# Patient Record
Sex: FEMALE | URBAN - METROPOLITAN AREA
[De-identification: names, ages, dates, MRNs, and addresses within clinical notes are randomized per-mention and may not be internally consistent; named-entity substitution may affect disease eponyms.]

---

## 2022-12-06 ENCOUNTER — APPOINTMENT (RX ONLY)
Dept: URBAN - METROPOLITAN AREA CLINIC 352 | Facility: CLINIC | Age: 67
Setting detail: DERMATOLOGY
End: 2022-12-06

## 2022-12-06 DIAGNOSIS — B02.9 ZOSTER WITHOUT COMPLICATIONS: ICD-10-CM

## 2022-12-06 PROCEDURE — 99204 OFFICE O/P NEW MOD 45 MIN: CPT

## 2022-12-06 PROCEDURE — ? DIAGNOSIS COMMENT

## 2022-12-06 PROCEDURE — ? PRESCRIPTION MEDICATION MANAGEMENT

## 2022-12-06 PROCEDURE — ? PRESCRIPTION

## 2022-12-06 PROCEDURE — ? COUNSELING

## 2022-12-06 RX ORDER — VALACYCLOVIR 1 G/1
1 TABLET, FILM COATED ORAL TID
Qty: 21 | Refills: 0 | Status: ERX | COMMUNITY
Start: 2022-12-06

## 2022-12-06 RX ADMIN — VALACYCLOVIR 1: 1 TABLET, FILM COATED ORAL at 00:00

## 2022-12-06 ASSESSMENT — LOCATION SIMPLE DESCRIPTION DERM
LOCATION SIMPLE: L3 RIGHT POSTERIOR DERMATOME
LOCATION SIMPLE: L3 RIGHT ANTERIOR DERMATOME

## 2022-12-06 ASSESSMENT — LOCATION ZONE DERM
LOCATION ZONE: LEG
LOCATION ZONE: TRUNK

## 2022-12-06 ASSESSMENT — LOCATION DETAILED DESCRIPTION DERM
LOCATION DETAILED: L3 RIGHT ANTERIOR DERMATOME
LOCATION DETAILED: L3 RIGHT POSTERIOR DERMATOME

## 2022-12-06 NOTE — PROCEDURE: PRESCRIPTION MEDICATION MANAGEMENT
Initiate Treatment: valacyclovir 1 gram tablet Tid\\nQuantity: 21.0 Tablet  Days Supply: 7\\nSig: Take 1 tablet 3 times daily x 7 days
Render In Strict Bullet Format?: No
Continue Regimen: Otc hydrocortisone
Detail Level: Zone

## 2022-12-06 NOTE — PROCEDURE: DIAGNOSIS COMMENT
Detail Level: Simple
Render Risk Assessment In Note?: no
Comment: Discussed the diagnosis of VZV/Herpes Zoster with the patient.

## 2022-12-15 ENCOUNTER — RX ONLY (OUTPATIENT)
Age: 67
Setting detail: RX ONLY
End: 2022-12-15

## 2022-12-15 RX ORDER — GABAPENTIN 300 MG/1
1 CAPSULE ORAL AS DIRECTED
Qty: 90 | Refills: 0 | Status: ERX | COMMUNITY
Start: 2022-12-15

## 2022-12-15 RX ORDER — LIDOCAINE 50 MG/G
3 PATCH CUTANEOUS
Qty: 60 | Refills: 0 | Status: ERX | COMMUNITY
Start: 2022-12-15

## 2022-12-20 ENCOUNTER — APPOINTMENT (RX ONLY)
Dept: URBAN - METROPOLITAN AREA CLINIC 352 | Facility: CLINIC | Age: 67
Setting detail: DERMATOLOGY
End: 2022-12-20

## 2022-12-23 ENCOUNTER — RX ONLY (OUTPATIENT)
Age: 67
Setting detail: RX ONLY
End: 2022-12-23

## 2022-12-23 ENCOUNTER — APPOINTMENT (RX ONLY)
Dept: URBAN - METROPOLITAN AREA CLINIC 90 | Facility: CLINIC | Age: 67
Setting detail: DERMATOLOGY
End: 2022-12-23

## 2022-12-23 DIAGNOSIS — B02.9 ZOSTER WITHOUT COMPLICATIONS: ICD-10-CM

## 2022-12-23 PROCEDURE — ? COUNSELING

## 2022-12-23 PROCEDURE — 99212 OFFICE O/P EST SF 10 MIN: CPT

## 2022-12-23 RX ORDER — GABAPENTIN 300 MG/1
1 CAPSULE ORAL AS DIRECTED
Qty: 90 | Refills: 0 | Status: ERX

## 2022-12-23 RX ORDER — HYDROXYZINE HYDROCHLORIDE 10 MG/1
TABLET, FILM COATED ORAL
Qty: 30 | Refills: 0 | Status: ERX | COMMUNITY
Start: 2022-12-23

## 2022-12-23 RX ORDER — BETAMETHASONE DIPROPIONATE 0.5 MG/G
OINTMENT TOPICAL
Qty: 45 | Refills: 2 | Status: ERX | COMMUNITY
Start: 2022-12-23

## 2022-12-23 ASSESSMENT — LOCATION ZONE DERM
LOCATION ZONE: TRUNK
LOCATION ZONE: LEG

## 2022-12-23 ASSESSMENT — LOCATION DETAILED DESCRIPTION DERM
LOCATION DETAILED: L3 RIGHT ANTERIOR DERMATOME
LOCATION DETAILED: L3 RIGHT POSTERIOR DERMATOME

## 2022-12-23 ASSESSMENT — LOCATION SIMPLE DESCRIPTION DERM
LOCATION SIMPLE: L3 RIGHT ANTERIOR DERMATOME
LOCATION SIMPLE: L3 RIGHT POSTERIOR DERMATOME